# Patient Record
Sex: MALE | Race: WHITE | NOT HISPANIC OR LATINO | URBAN - METROPOLITAN AREA
[De-identification: names, ages, dates, MRNs, and addresses within clinical notes are randomized per-mention and may not be internally consistent; named-entity substitution may affect disease eponyms.]

---

## 2017-04-03 ENCOUNTER — COMMUNICATION - HEALTHEAST (OUTPATIENT)
Dept: FAMILY MEDICINE | Facility: CLINIC | Age: 47
End: 2017-04-03

## 2017-10-19 ENCOUNTER — COMMUNICATION - HEALTHEAST (OUTPATIENT)
Dept: FAMILY MEDICINE | Facility: CLINIC | Age: 47
End: 2017-10-19

## 2017-10-19 ENCOUNTER — OFFICE VISIT - HEALTHEAST (OUTPATIENT)
Dept: FAMILY MEDICINE | Facility: CLINIC | Age: 47
End: 2017-10-19

## 2017-10-19 DIAGNOSIS — M54.50 LOWER BACK PAIN: ICD-10-CM

## 2017-11-17 ENCOUNTER — COMMUNICATION - HEALTHEAST (OUTPATIENT)
Dept: FAMILY MEDICINE | Facility: CLINIC | Age: 47
End: 2017-11-17

## 2017-11-17 ENCOUNTER — OFFICE VISIT - HEALTHEAST (OUTPATIENT)
Dept: FAMILY MEDICINE | Facility: CLINIC | Age: 47
End: 2017-11-17

## 2017-11-17 DIAGNOSIS — R05.9 COUGH: ICD-10-CM

## 2017-11-17 RX ORDER — CODEINE PHOSPHATE AND GUAIFENESIN 10; 100 MG/5ML; MG/5ML
10 SOLUTION ORAL 4 TIMES DAILY PRN
Qty: 240 ML | Refills: 0 | Status: SHIPPED | OUTPATIENT
Start: 2017-11-17

## 2021-05-30 ENCOUNTER — RECORDS - HEALTHEAST (OUTPATIENT)
Dept: ADMINISTRATIVE | Facility: CLINIC | Age: 51
End: 2021-05-30

## 2021-05-31 ENCOUNTER — RECORDS - HEALTHEAST (OUTPATIENT)
Dept: ADMINISTRATIVE | Facility: CLINIC | Age: 51
End: 2021-05-31

## 2021-05-31 VITALS — WEIGHT: 193.7 LBS | BODY MASS INDEX: 26.45 KG/M2

## 2021-05-31 VITALS — WEIGHT: 200 LBS | BODY MASS INDEX: 27.31 KG/M2

## 2021-06-13 NOTE — PROGRESS NOTES
Assessment/Plan:        Diagnoses and all orders for this visit:    Lower back pain    Other orders  -     Cancel: QTF-Mycobacterium tuberculosis by [facility]  -     cyclobenzaprine (FLEXERIL) 10 MG tablet; Take 1 tablet (10 mg total) by mouth 3 (three) times a day as needed for muscle spasms.  Dispense: 20 tablet; Refill: 0  -     traMADol (ULTRAM) 50 mg tablet; Take 1 tablet (50 mg total) by mouth every 8 (eight) hours as needed for pain.  Dispense: 20 tablet; Refill: 0        We will hold off on imaging at this time.  Discussed pain control and muscle relaxant.  Offered tramadol, cyclobenzaprine.  He wonders about oxycodone.  Would like to hold off on that for now unless symptoms are worse then consider a further evaluation.  He prefers oxycodone but I would not feel comfortable at this time.  Offered seeing another provider for opinion.  He will try medication for now and if uncontrolled, consider further investigation.  Advised maximum dosing for ibuprofen.  Subjective:    Patient ID: Reji Lehman is a 47 y.o. male.    HPI    Reji is here with concerns about lower back pain.  Had an accident before.  He had bone chips removed from his lumbar region.  He recovered from that nicely.  He fell off from an 8 foot level.  He was moving furniture as a week ago.  Was probably too much.  It was noted after the event.  He denies any hopping sensation in his lower back.  Pain in his lower back, bilateral and going to his buttocks and posterior thigh.  Denies any weakness.  Some tingling.  Pain is sharp, dagger like, 7-8/10.  It is worse when he gets up in the morning but gets better after 6-7 hours of being on his feet.  Works as a Home .  Also worse when he is laying on his back.  He has been taking ibuprofen or Advil up to 5 tablets per intake twice a day.  Was not providing as much benefit.  No incontinence.  No fever, nausea, vomiting.  He wonders about taking oxycodone for his symptoms.   Elects not to have steroids as he had nausea with it before.  He was on Vicodin in the past but can flareup of migraine.  He had several muscle relaxants before but could not remember the names that cause dizziness, nausea.    Review of Systems  As above otherwise negative.          Objective:    Physical Exam  /88  Pulse 80  Wt 193 lb 11.2 oz (87.9 kg)  BMI 26.45 kg/m2    Vital signs noted above. AAO ×3.  HEENT no nasal discharge, moist oral mucosa. Neck: Supple neck, nonpalpable cervical lymph nodes.  Lungs: Clear to auscultation bilateral.  Heart: S1-S2 regular rate and rhythm, no murmurs were noted.  Abdomen: Flabby, soft with bowel sounds and nontender.  Extremities: No edema, pulses were full and equal.  Negative straight leg raising.  Localized pain in his lower back into his buttocks with leg raising.  Minimal puffiness noted in his left lower back, no erythema, warmth.  No localized tenderness.

## 2021-06-14 NOTE — PROGRESS NOTES
ASSESSMENT:  1. Cough  Chest x-ray negative.  Suspect viral bronchitis.  - XR Chest PA and Lateral      PLAN:  1.  Symptomatic treatment with cough medicine with codeine.  2.  I will up as needed, explained to the patient that I do not believe he would benefit from antibiotics.      Orders Placed This Encounter   Procedures     XR Chest PA and Lateral     Order Specific Question:   Can the procedure be changed per Radiologist protocol?     Answer:   Yes     Medications Discontinued During This Encounter   Medication Reason     ALPRAZolam (XANAX) 0.5 MG tablet Therapy completed     cyclobenzaprine (FLEXERIL) 10 MG tablet Therapy completed     lisinopril (PRINIVIL,ZESTRIL) 10 MG tablet Therapy completed     oxyCODONE-acetaminophen (PERCOCET) 5-325 mg per tablet Therapy completed     tadalafil (CIALIS) 20 MG tablet Therapy completed     traMADol (ULTRAM) 50 mg tablet Therapy completed       No Follow-up on file.    CHIEF COMPLAINT:  Chief Complaint   Patient presents with     Cough     congestion the past 2 weeks , SOB/wheeze at times - ? walking PNA NEEDS: flu vacc        SUBJECTIVE:  Reji is a 47 y.o. male presenting to the clinic today with a cough.    Cough: For roughly the last week and a half, he has been experiencing congestion in the lungs. This has caused extended periods of coughing fits. They occur primarily when he is lying down. He also loses wind, feels short of breath, and begins coughing after conversing. He has been coughing up some mucus, but denies a fever. He has a headache but attributes it to coughing hard and often. He states that he had walking pneumonia a few years ago. Of note, he currently smokes about half a pack a day.     Health Maintenance: He declines the influenza vaccine.     REVIEW OF SYSTEMS:   He states that he is allergic to antihistamines.   All other systems are negative.    PFSH:  Immunization History   Administered Date(s) Administered     DT (pediatric) 01/01/2003      Td,adult,historic,unspecified 01/01/2003     Tdap 03/11/2016     Social History     Social History     Marital status: Single     Spouse name: N/A     Number of children: N/A     Years of education: N/A     Occupational History     Not on file.     Social History Main Topics     Smoking status: Current Every Day Smoker     Smokeless tobacco: Never Used     Alcohol use Not on file     Drug use: Not on file     Sexual activity: Not on file     Other Topics Concern     Not on file     Social History Narrative     No past medical history on file.  No family history on file.    MEDICATIONS:  Current Outpatient Prescriptions   Medication Sig Dispense Refill     codeine-guaiFENesin (GUAIFENESIN AC)  mg/5 mL liquid Take 10 mL by mouth 4 (four) times a day as needed for cough. 240 mL 0     No current facility-administered medications for this visit.        TOBACCO USE:  History   Smoking Status     Current Every Day Smoker   Smokeless Tobacco     Never Used       VITALS:  Vitals:    11/17/17 1117   BP: 110/72   Pulse: 78   SpO2: 97%   Weight: 200 lb (90.7 kg)     Wt Readings from Last 3 Encounters:   11/17/17 200 lb (90.7 kg)   10/19/17 193 lb 11.2 oz (87.9 kg)   03/11/16 (!) 234 lb (106.1 kg)       PHYSICAL EXAM:  Constitutional:   Reveals an alert, pleasant, talkative male.  Vitals: per nursing notes.  HEENT:  Ears:  External canals, TMs clear.    Eyes:  EOMs full, PERRL.  Lungs: Clear to A&P without rales or wheezes.  Respiratory effort normal.  Cardiac:   Regular rate and rhythm, normal S1, S2, no murmur or gallop.  Musculoskeletal: No peripheral swelling.  Neuro:  Alert and oriented. Cranial nerves, motor, sensory exams are intact.  No gross focal deficits.  Psychiatric:  Memory intact, mood appropriate.    DATA REVIEWED:  Additional History from Old Records Summarized (2): Reviewed progress note 10/19/17; back pain.  Decision to Obtain Records (1): None.  Radiology Tests Summarized or Ordered (1): Ordered chest  x-ray.  Labs Reviewed or Ordered (1): None.  Medicine Test Summarized or Ordered (1): None.   Independent Review of EKG, X-RAY, or RAPID STREP (2 each): Review of ordered chest x-ray; negative.     The visit lasted a total of 6 minutes face to face with the patient. Over 50% of the time was spent counseling and educating the patient about coughing.    INiles, am scribing for and in the presence of, Dr. Urena.    I, Dr. Urena, personally performed the services described in this documentation, as scribed by Niles Montoya in my presence, and it is both accurate and complete.    Total data points: 5